# Patient Record
Sex: MALE | Race: WHITE | Employment: FULL TIME
[De-identification: names, ages, dates, MRNs, and addresses within clinical notes are randomized per-mention and may not be internally consistent; named-entity substitution may affect disease eponyms.]

---

## 2022-03-19 PROBLEM — B00.2: Status: ACTIVE | Noted: 2017-02-28

## 2022-03-19 PROBLEM — F90.0 ATTENTION DEFICIT HYPERACTIVITY DISORDER (ADHD), PREDOMINANTLY INATTENTIVE TYPE: Status: ACTIVE | Noted: 2017-10-04

## 2022-08-09 ENCOUNTER — NURSE ONLY (OUTPATIENT)
Dept: FAMILY MEDICINE CLINIC | Facility: CLINIC | Age: 27
End: 2022-08-09

## 2022-08-09 ENCOUNTER — OFFICE VISIT (OUTPATIENT)
Dept: FAMILY MEDICINE CLINIC | Facility: CLINIC | Age: 27
End: 2022-08-09
Payer: COMMERCIAL

## 2022-08-09 VITALS
SYSTOLIC BLOOD PRESSURE: 138 MMHG | DIASTOLIC BLOOD PRESSURE: 89 MMHG | RESPIRATION RATE: 18 BRPM | WEIGHT: 252.5 LBS | HEIGHT: 72 IN | OXYGEN SATURATION: 96 % | TEMPERATURE: 99 F | BODY MASS INDEX: 34.2 KG/M2 | HEART RATE: 77 BPM

## 2022-08-09 DIAGNOSIS — Z11.59 NEED FOR HEPATITIS C SCREENING TEST: ICD-10-CM

## 2022-08-09 DIAGNOSIS — Z13.220 LIPID SCREENING: ICD-10-CM

## 2022-08-09 DIAGNOSIS — Z56.6 STRESS AT WORK: ICD-10-CM

## 2022-08-09 DIAGNOSIS — Z11.4 ENCOUNTER FOR SCREENING FOR HIV: ICD-10-CM

## 2022-08-09 DIAGNOSIS — R07.9 CHEST PAIN, UNSPECIFIED TYPE: ICD-10-CM

## 2022-08-09 DIAGNOSIS — E66.09 CLASS 1 OBESITY DUE TO EXCESS CALORIES WITHOUT SERIOUS COMORBIDITY WITH BODY MASS INDEX (BMI) OF 34.0 TO 34.9 IN ADULT: ICD-10-CM

## 2022-08-09 DIAGNOSIS — Z00.00 WELL ADULT HEALTH CHECK: Primary | ICD-10-CM

## 2022-08-09 DIAGNOSIS — Z00.00 WELL ADULT HEALTH CHECK: ICD-10-CM

## 2022-08-09 DIAGNOSIS — R03.0 PREHYPERTENSION: ICD-10-CM

## 2022-08-09 LAB
BASOPHILS # BLD: 0.1 K/UL (ref 0–0.2)
BASOPHILS NFR BLD: 1 % (ref 0–2)
DIFFERENTIAL METHOD BLD: ABNORMAL
EOSINOPHIL # BLD: 0.1 K/UL (ref 0–0.8)
EOSINOPHIL NFR BLD: 1 % (ref 0.5–7.8)
ERYTHROCYTE [DISTWIDTH] IN BLOOD BY AUTOMATED COUNT: 13.1 % (ref 11.9–14.6)
HCT VFR BLD AUTO: 51.9 % (ref 41.1–50.3)
HGB BLD-MCNC: 16.9 G/DL (ref 13.6–17.2)
IMM GRANULOCYTES # BLD AUTO: 0 K/UL (ref 0–0.5)
IMM GRANULOCYTES NFR BLD AUTO: 0 % (ref 0–5)
LYMPHOCYTES # BLD: 2.3 K/UL (ref 0.5–4.6)
LYMPHOCYTES NFR BLD: 26 % (ref 13–44)
MCH RBC QN AUTO: 27.3 PG (ref 26.1–32.9)
MCHC RBC AUTO-ENTMCNC: 32.6 G/DL (ref 31.4–35)
MCV RBC AUTO: 84 FL (ref 79.6–97.8)
MONOCYTES # BLD: 0.8 K/UL (ref 0.1–1.3)
MONOCYTES NFR BLD: 9 % (ref 4–12)
NEUTS SEG # BLD: 5.4 K/UL (ref 1.7–8.2)
NEUTS SEG NFR BLD: 63 % (ref 43–78)
NRBC # BLD: 0 K/UL (ref 0–0.2)
PLATELET # BLD AUTO: 336 K/UL (ref 150–450)
PMV BLD AUTO: 10.7 FL (ref 9.4–12.3)
RBC # BLD AUTO: 6.18 M/UL (ref 4.23–5.6)
WBC # BLD AUTO: 8.7 K/UL (ref 4.3–11.1)

## 2022-08-09 PROCEDURE — 93000 ELECTROCARDIOGRAM COMPLETE: CPT | Performed by: FAMILY MEDICINE

## 2022-08-09 PROCEDURE — 99385 PREV VISIT NEW AGE 18-39: CPT | Performed by: FAMILY MEDICINE

## 2022-08-09 SDOH — HEALTH STABILITY - MENTAL HEALTH: OTHER PHYSICAL AND MENTAL STRAIN RELATED TO WORK: Z56.6

## 2022-08-09 NOTE — PROGRESS NOTES
[address] [facility] [address]  Phone: [phone] Fax [phone]  Ilene Zelaya M.D.  8/9/2022        Marina Humphrey is a 32 y.o. male     HPI:  Patient is seen for Other (Reestablished care, needs forms filled out for insurance )  Patient comes in today for wellness exam fasting to reestablish care. He describes a fair amount of stress associated with his work. Notices higher heart rates while at work with this documented by his [employer] watch. He occasionally will notice some left side chest tightness more noted with stress and not specifically with exertion. No specific exercise other than playing golf on occasion. Has noted some weight gain. ROS:  Constitutional: denies excessive fatigue; no fever or chills; no polydipsia polyphagia or polyuria  HEENT: no nasal pressure or drainage, no otalgia or tinnitus or decreased hearing   Pulmonary: no dyspnea cough or wheeze  Cardiac: no palpitations, no tachycardia, no PND or orthopnea. GI: no dyspepsia or reflux; normal bowel movements, no nausea or vomiting or diarrhea, no hematochezia or melena, no abdominal pain  : normal urination without dysuria or hematuria, nocturia or urinary hesitancy; no significant incontinence  MSK: no significant myalgias or arthralgias except mild back discomfort from previous injury without radicular pain.   Neurological: no memory loss or trouble with balance or falls, no numbness or tingling or weakness  Psychiatric: no depression, no insomnia, no suicidal or homicidal ideation  Skin: no rash or itching or new lesions    No Known Allergies    Active Ambulatory Problems     Diagnosis Date Noted    Pharyngitis due to herpes simplex virus (HSV) 02/28/2017    Attention deficit hyperactivity disorder (ADHD), predominantly inattentive type 10/04/2017    Prehypertension 08/09/2022     Resolved Ambulatory Problems     Diagnosis Date Noted    No Resolved Ambulatory Problems No Additional Past Medical History        Past Surgical History:   Procedure Laterality Date    FRACTURE SURGERY  2012    Dr. Ravi Babin; reset wrist fracture    Michael Figueroa; age 25      Family History   Problem Relation Age of Onset    Hypertension Mother     Hypertension Father     High Cholesterol Father     Asthma Father     Dementia Maternal Grandmother     Heart Disease Maternal Grandfather     Cancer Paternal Grandmother         ovarian    Cancer Paternal Grandfather         prostate       Social History     Tobacco Use    Smoking status: Never    Smokeless tobacco: Never   Vaping Use    Vaping Use: Never used   Substance Use Topics    Alcohol use: Yes     Alcohol/week: 1.0 standard drink     Types: 1 Shots of liquor per week    Drug use: Never     Immunization History   Administered Date(s) Administered    COVID-19, MODERNA BLUE border, Primary or Immunocompromised, (age 12y+), IM, 100 mcg/0.5mL 07/18/2021, 08/15/2021    DTaP vaccine 1995, 1995, 03/01/1996, 08/27/1996, 10/08/1999    Hepatitis B vaccine 1995, 1995, 03/01/1996    Hib vaccine 1995, 1995, 03/01/1996, 08/27/1996    MMR 08/27/1996, 10/08/1999    Meningococcal MCV4P (Menactra) 07/26/2013    Polio Virus Vaccine 1995, 1995, 03/01/1996, 10/08/1999    Tdap (Boostrix, Adacel) 07/26/2013    Varicella (Varivax) 08/27/1996       Physical Exam:  Blood pressure 138/89, pulse 77, temperature 99 °F (37.2 °C), temperature source Temporal, resp. rate 18, height 6' (1.829 m), weight 252 lb 8 oz (114.5 kg), SpO2 96 %. HEENT: TMs and external canals clear. EOMI. Nasal mucosa and oropharynx moist and intact. Neck: supple, no cervical adenopathy; no appreciable thyromegaly or thyroid nodules; appropriate carotid upstroke. Lungs: normal inspiratory movement, clear with good breath sounds and no rales, wheezes, or rhonchi  CV: No JVD or hepatojugular reflux.   Normal S1 and S2 with regular rate and rhythm, no murmurs or rubs or gallops; radial and femoral pulses palpable. No palpable chest wall tenderness. Abdomen: soft, moderately obese, and nontender, no masses or hepatosplenomegaly; positive bowel sounds. Extremities: no peripheral edema or cyanosis; moves all extremities well  Neurological: alert and oriented x 3; normal gait and 2+ patellar deep tendon reflexes  Dermatological: skin warm dry and intact without significant rashes or concerning lesions. No inguinal or axillary lymphadenopathy. Affect is appropriate. Results for orders placed or performed in visit on 08/09/22   EKG 12 Lead    Impression    Twelve-lead EKG with ventricular rate of 68. Normal sinus rhythm and normal EKG. Assessment and Plan:   Diagnosis Orders   1. Well adult health check  EKG 12 Lead    CBC with Auto Differential    Comprehensive Metabolic Panel    Hepatitis C Antibody    HIV 1/2 Ag/Ab, 4TH Generation,W Rflx Confirm    Lipid Panel    TSH      2. Stress at work        3. Chest pain, unspecified type  EKG 12 Lead      4. Prehypertension  CBC with Auto Differential    Comprehensive Metabolic Panel    TSH      5. Class 1 obesity due to excess calories without serious comorbidity with body mass index (BMI) of 34.0 to 34.9 in adult        6. Lipid screening  Lipid Panel      7. Encounter for screening for HIV  HIV 1/2 Ag/Ab, 4TH Generation,W Rflx Confirm      8. Need for hepatitis C screening test  Hepatitis C Antibody        Information on wellness/anticipatory guidance provided. Also information on elevated blood pressure and home blood pressure monitoring provided and information chest pain, starting weight loss plan, low carbohydrate diet, stress management, and stress mindfulness provided. Await fasting labs as above. Encouraged 150 minutes of low impact aerobic activity weekly. Also encouraged resistance training every other day with 24-48 hours of muscle glucose uptake subsequently.   Reviewed with patient his EKG as above. Seek emergent help as needed. Encouraged healthy diet and weight loss. Reassess here in 4 months or more quickly as needed. Discharge Meds:  No current outpatient medications on file. No current facility-administered medications for this visit. Return in about 4 months (around 12/9/2022). Any new medications were explained today including any potential drug interactions or significant side effects. The patient's questions in regards to this were answered today. Dictated using voice recognition software.   Proofread, but unrecognized errors may exist.

## 2022-08-10 LAB
ALBUMIN SERPL-MCNC: 4.4 G/DL (ref 3.5–5)
ALBUMIN/GLOB SERPL: 1.5 {RATIO} (ref 1.2–3.5)
ALP SERPL-CCNC: 99 U/L (ref 50–136)
ALT SERPL-CCNC: 59 U/L (ref 12–65)
ANION GAP SERPL CALC-SCNC: 6 MMOL/L (ref 7–16)
AST SERPL-CCNC: 22 U/L (ref 15–37)
BILIRUB SERPL-MCNC: 0.5 MG/DL (ref 0.2–1.1)
BUN SERPL-MCNC: 9 MG/DL (ref 6–23)
CALCIUM SERPL-MCNC: 9.4 MG/DL (ref 8.3–10.4)
CHLORIDE SERPL-SCNC: 106 MMOL/L (ref 98–107)
CHOLEST SERPL-MCNC: 247 MG/DL
CO2 SERPL-SCNC: 25 MMOL/L (ref 21–32)
CREAT SERPL-MCNC: 0.9 MG/DL (ref 0.8–1.5)
GLOBULIN SER CALC-MCNC: 3 G/DL (ref 2.3–3.5)
GLUCOSE SERPL-MCNC: 65 MG/DL (ref 65–100)
HCV AB SER QL: NONREACTIVE
HDLC SERPL-MCNC: 30 MG/DL (ref 40–60)
HDLC SERPL: 8.2 {RATIO}
HIV 1+2 AB+HIV1 P24 AG SERPL QL IA: NONREACTIVE
HIV 1/2 RESULT COMMENT: NORMAL
LDLC SERPL CALC-MCNC: 170.2 MG/DL
POTASSIUM SERPL-SCNC: 4.3 MMOL/L (ref 3.5–5.1)
PROT SERPL-MCNC: 7.4 G/DL (ref 6.3–8.2)
SODIUM SERPL-SCNC: 137 MMOL/L (ref 136–145)
TRIGL SERPL-MCNC: 234 MG/DL (ref 35–150)
TSH, 3RD GENERATION: 0.75 UIU/ML (ref 0.36–3.74)
VLDLC SERPL CALC-MCNC: 46.8 MG/DL (ref 6–23)

## 2024-02-14 ENCOUNTER — E-VISIT (OUTPATIENT)
Dept: FAMILY MEDICINE CLINIC | Facility: CLINIC | Age: 29
End: 2024-02-14
Payer: COMMERCIAL

## 2024-02-14 DIAGNOSIS — J11.1 INFLUENZA: Primary | ICD-10-CM

## 2024-02-14 PROCEDURE — 99421 OL DIG E/M SVC 5-10 MIN: CPT | Performed by: FAMILY MEDICINE

## 2024-02-15 RX ORDER — OSELTAMIVIR PHOSPHATE 75 MG/1
75 CAPSULE ORAL 2 TIMES DAILY
Qty: 10 CAPSULE | Refills: 0 | Status: SHIPPED | OUTPATIENT
Start: 2024-02-15 | End: 2024-02-20

## 2024-02-15 NOTE — PROGRESS NOTES
Johnny Wagner  1995 initiated an asynchronous digital communication through [employer].    HPI: per patient questionnaire     Exam: not applicable    Diagnoses and all orders for this visit:  Diagnoses and all orders for this visit:    Influenza  -     oseltamivir (TAMIFLU) 75 MG capsule; Take 1 capsule by mouth 2 times daily for 5 days    Treat with Tamiflu twice daily for 5 days.  May also continue other symptomatic treatment.  Recommend fluids and rest.  Reassess as needed.      Time: EV1 - 5-10 minutes were spent on the digital evaluation and management of this patient.      Elliott Serra MD

## 2024-04-10 ENCOUNTER — E-VISIT (OUTPATIENT)
Dept: FAMILY MEDICINE CLINIC | Facility: CLINIC | Age: 29
End: 2024-04-10
Payer: COMMERCIAL

## 2024-04-10 DIAGNOSIS — J06.9 UPPER RESPIRATORY TRACT INFECTION, UNSPECIFIED TYPE: Primary | ICD-10-CM

## 2024-04-10 PROCEDURE — 99421 OL DIG E/M SVC 5-10 MIN: CPT | Performed by: FAMILY MEDICINE

## 2024-04-10 ASSESSMENT — LIFESTYLE VARIABLES: SMOKING_STATUS: NO, I'VE NEVER SMOKED

## 2024-04-11 RX ORDER — AMOXICILLIN 875 MG/1
875 TABLET, COATED ORAL 2 TIMES DAILY
Qty: 20 TABLET | Refills: 0 | Status: SHIPPED | OUTPATIENT
Start: 2024-04-11 | End: 2024-04-21

## 2024-04-11 NOTE — PROGRESS NOTES
Johnny Wagner  1995 initiated an asynchronous digital communication through [employer].    HPI: per patient questionnaire     Exam: not applicable    Diagnoses and all orders for this visit:  Diagnoses and all orders for this visit:    Upper respiratory tract infection, unspecified type  -     amoxicillin (AMOXIL) 875 MG tablet; Take 1 tablet by mouth 2 times daily for 10 days     I will send in an antibiotic but if not improved soon, may need further evaluation.      Time: EV1 - 5-10 minutes were spent on the digital evaluation and management of this patient.      Elliott Serra MD

## 2024-04-15 ENCOUNTER — OFFICE VISIT (OUTPATIENT)
Dept: FAMILY MEDICINE CLINIC | Facility: CLINIC | Age: 29
End: 2024-04-15
Payer: COMMERCIAL

## 2024-04-15 VITALS
BODY MASS INDEX: 37.59 KG/M2 | HEART RATE: 84 BPM | HEIGHT: 72 IN | OXYGEN SATURATION: 97 % | SYSTOLIC BLOOD PRESSURE: 147 MMHG | WEIGHT: 277.5 LBS | DIASTOLIC BLOOD PRESSURE: 98 MMHG | TEMPERATURE: 98.2 F | RESPIRATION RATE: 16 BRPM

## 2024-04-15 DIAGNOSIS — R03.0 ELEVATED BLOOD PRESSURE READING WITHOUT DIAGNOSIS OF HYPERTENSION: ICD-10-CM

## 2024-04-15 DIAGNOSIS — K12.0 RECURRENT APHTHOUS ULCER: Primary | ICD-10-CM

## 2024-04-15 PROCEDURE — 99213 OFFICE O/P EST LOW 20 MIN: CPT | Performed by: PHYSICIAN ASSISTANT

## 2024-04-15 RX ORDER — VALACYCLOVIR HYDROCHLORIDE 1 G/1
1000 TABLET, FILM COATED ORAL 2 TIMES DAILY
Qty: 10 TABLET | Refills: 5 | Status: SHIPPED | OUTPATIENT
Start: 2024-04-15

## 2024-04-15 SDOH — ECONOMIC STABILITY: TRANSPORTATION INSECURITY
IN THE PAST 12 MONTHS, HAS LACK OF TRANSPORTATION KEPT YOU FROM MEETINGS, WORK, OR FROM GETTING THINGS NEEDED FOR DAILY LIVING?: PATIENT DECLINED

## 2024-04-15 SDOH — ECONOMIC STABILITY: INCOME INSECURITY: HOW HARD IS IT FOR YOU TO PAY FOR THE VERY BASICS LIKE FOOD, HOUSING, MEDICAL CARE, AND HEATING?: PATIENT DECLINED

## 2024-04-15 SDOH — ECONOMIC STABILITY: FOOD INSECURITY: WITHIN THE PAST 12 MONTHS, THE FOOD YOU BOUGHT JUST DIDN'T LAST AND YOU DIDN'T HAVE MONEY TO GET MORE.: PATIENT DECLINED

## 2024-04-15 SDOH — ECONOMIC STABILITY: HOUSING INSECURITY
IN THE LAST 12 MONTHS, WAS THERE A TIME WHEN YOU DID NOT HAVE A STEADY PLACE TO SLEEP OR SLEPT IN A SHELTER (INCLUDING NOW)?: NO

## 2024-04-15 SDOH — ECONOMIC STABILITY: FOOD INSECURITY: WITHIN THE PAST 12 MONTHS, YOU WORRIED THAT YOUR FOOD WOULD RUN OUT BEFORE YOU GOT MONEY TO BUY MORE.: PATIENT DECLINED

## 2024-04-15 ASSESSMENT — ENCOUNTER SYMPTOMS
RHINORRHEA: 1
SINUS PAIN: 0
SORE THROAT: 1
SINUS PRESSURE: 1
WHEEZING: 0
COUGH: 1
SHORTNESS OF BREATH: 0

## 2024-04-15 ASSESSMENT — PATIENT HEALTH QUESTIONNAIRE - PHQ9
1. LITTLE INTEREST OR PLEASURE IN DOING THINGS: SEVERAL DAYS
SUM OF ALL RESPONSES TO PHQ QUESTIONS 1-9: 2
SUM OF ALL RESPONSES TO PHQ9 QUESTIONS 1 & 2: 2
SUM OF ALL RESPONSES TO PHQ9 QUESTIONS 1 & 2: 2
2. FEELING DOWN, DEPRESSED OR HOPELESS: SEVERAL DAYS
1. LITTLE INTEREST OR PLEASURE IN DOING THINGS: SEVERAL DAYS
2. FEELING DOWN, DEPRESSED OR HOPELESS: SEVERAL DAYS
SUM OF ALL RESPONSES TO PHQ QUESTIONS 1-9: 2

## 2024-04-15 NOTE — PROGRESS NOTES
regular rhythm.      Heart sounds: Normal heart sounds.   Pulmonary:      Effort: Pulmonary effort is normal.      Breath sounds: Normal breath sounds. No wheezing, rhonchi or rales.   Musculoskeletal:      Cervical back: Neck supple.      Right lower leg: No edema.      Left lower leg: No edema.   Lymphadenopathy:      Cervical: No cervical adenopathy.   Neurological:      Mental Status: He is alert.   Psychiatric:         Mood and Affect: Mood normal.         Behavior: Behavior normal.         Thought Content: Thought content normal.       ASSESSMENT & PLAN    ICD-10-CM    1. Recurrent aphthous ulcer  K12.0 valACYclovir (VALTREX) 1 g tablet      2. Elevated blood pressure reading without diagnosis of hypertension  R03.0            1. Recurrent aphthous ulcer  *Will treat current episode with Valtrex twice daily for 5 days.  Refills given for any future recurrence.  - valACYclovir (VALTREX) 1 g tablet; Take 1 tablet by mouth 2 times daily  Dispense: 10 tablet; Refill: 5    2. Elevated blood pressure reading without diagnosis of hypertension  *Blood pressure somewhat elevated today.  No prior history of hypertension.  He does have the ability to check his blood pressure at home.  Have asked patient to monitor blood pressure at home.  He is to record readings and call if consistently greater than 140/90.      No orders of the defined types were placed in this encounter.    I have reviewed the patient's past medical history, social history and family history and vitals.  We have discussed treatment plan and follow up and given patient instructions.  Patient's questions are answered and we will follow up as indicated.    Dictated using voice recognition software.  Proof read but unrecognized errors may exist.    Follow-up and Dispositions    Return if symptoms worsen or fail to improve.         Lion Crockett PA-C

## 2024-04-15 NOTE — PATIENT INSTRUCTIONS
For this current issue, take the valacyclovir twice daily for 5 days.  He will have refills on this to start if you should have ulcers that pop up again in the future.  Try to take it as soon as possible once the ulcer appears.

## 2024-05-17 DIAGNOSIS — K12.0 RECURRENT APHTHOUS ULCER: ICD-10-CM

## 2024-05-20 RX ORDER — VALACYCLOVIR HYDROCHLORIDE 1 G/1
1000 TABLET, FILM COATED ORAL 2 TIMES DAILY
Qty: 10 TABLET | Refills: 5 | OUTPATIENT
Start: 2024-05-20

## 2024-05-21 ENCOUNTER — OFFICE VISIT (OUTPATIENT)
Dept: FAMILY MEDICINE CLINIC | Facility: CLINIC | Age: 29
End: 2024-05-21
Payer: COMMERCIAL

## 2024-05-21 VITALS
TEMPERATURE: 97.5 F | DIASTOLIC BLOOD PRESSURE: 102 MMHG | HEIGHT: 72 IN | WEIGHT: 273.4 LBS | SYSTOLIC BLOOD PRESSURE: 150 MMHG | HEART RATE: 81 BPM | RESPIRATION RATE: 16 BRPM | OXYGEN SATURATION: 98 % | BODY MASS INDEX: 37.03 KG/M2

## 2024-05-21 DIAGNOSIS — L23.7 CONTACT DERMATITIS DUE TO POISON IVY: ICD-10-CM

## 2024-05-21 DIAGNOSIS — K12.0 RECURRENT APHTHOUS ULCER: Primary | ICD-10-CM

## 2024-05-21 DIAGNOSIS — R03.0 ELEVATED BLOOD PRESSURE READING WITHOUT DIAGNOSIS OF HYPERTENSION: ICD-10-CM

## 2024-05-21 PROCEDURE — 99213 OFFICE O/P EST LOW 20 MIN: CPT | Performed by: PHYSICIAN ASSISTANT

## 2024-05-21 RX ORDER — TRIAMCINOLONE ACETONIDE 5 MG/G
CREAM TOPICAL
Qty: 45 G | Refills: 1 | Status: SHIPPED | OUTPATIENT
Start: 2024-05-21 | End: 2024-05-28

## 2024-05-21 RX ORDER — VALACYCLOVIR HYDROCHLORIDE 500 MG/1
500 TABLET, FILM COATED ORAL 2 TIMES DAILY
Qty: 60 TABLET | Refills: 5 | Status: SHIPPED | OUTPATIENT
Start: 2024-05-21

## 2024-05-21 ASSESSMENT — PATIENT HEALTH QUESTIONNAIRE - PHQ9
SUM OF ALL RESPONSES TO PHQ9 QUESTIONS 1 & 2: 0
1. LITTLE INTEREST OR PLEASURE IN DOING THINGS: NOT AT ALL
SUM OF ALL RESPONSES TO PHQ QUESTIONS 1-9: 0
2. FEELING DOWN, DEPRESSED OR HOPELESS: NOT AT ALL

## 2024-05-21 ASSESSMENT — ENCOUNTER SYMPTOMS
SORE THROAT: 1
TROUBLE SWALLOWING: 0
RHINORRHEA: 0

## 2024-05-21 NOTE — PATIENT INSTRUCTIONS
*Finish your current Valtrex 5-day course.  Then begin the 500 mg Valtrex twice daily and take this for at least the next 4 months.  We can reassess at that time to see if we need to continue or not.  *Please keep track of your blood pressure. Check it 3-4 days a week. Write down your results and bring them with you to your next office visit for review. We'd like you to stay < 140/90, and ideally < 130/80. Let us know if you find yourself above this routinely.  *Apply the

## 2024-05-21 NOTE — PROGRESS NOTES
[facility] [address] [address]  Phone [phone]      Patient: Johnny Wagner  YOB: 1995  Age 28 y.o.  Sex male  Medical Record:  [id number]  Visit Date: 05/21/24  Author:  Lion Crockett PA-C    [facility] Clinic Note    Chief Complaint   Patient presents with    Pharyngitis     Pt states he's still has ulcers on his tonsils       History of Present Illness  This is a 28-year-old male who returns today with complaints of recurrent ulcerations in his mouth.  He was seen here about 5 weeks ago for the same.  He was given Valtrex to take 1 g twice daily for 5 days for flares.  He states that the lesions seem to respond to the Valtrex and seem to disappear but recur again within a couple of days.  Currently has 2 spots on the posterior pharynx which have been aggravated again over the last few days.  These flares because constant discomfort in the throat.  Denies any difficulty swallowing.    He also notes that he has a rash on his right leg from what he feels was a poison ivy exposure.  Rash is pruritic.  Denies any associated pain.  No fever or chills.    Patient has a history of elevated blood pressure.  He was instructed, at last visit, to begin monitoring his blood pressure at home.  He states that he did order a blood pressure cuff and is expecting it to come in sometime this week.      Past History:    Past Medical history History reviewed. No pertinent past medical history.    Current Problem List:   Patient Active Problem List   Diagnosis    Pharyngitis due to herpes simplex virus (HSV)    Attention deficit hyperactivity disorder (ADHD), predominantly inattentive type    Prehypertension       Current Medications: .  Current Outpatient Medications   Medication Sig Dispense Refill    valACYclovir (VALTREX) 1 g tablet Take 1 tablet by mouth 2 times daily 10 tablet 5     No current facility-administered medications for this visit.        Allergies:No

## 2024-07-02 ENCOUNTER — OFFICE VISIT (OUTPATIENT)
Dept: FAMILY MEDICINE CLINIC | Facility: CLINIC | Age: 29
End: 2024-07-02
Payer: COMMERCIAL

## 2024-07-02 VITALS
HEIGHT: 72 IN | RESPIRATION RATE: 16 BRPM | TEMPERATURE: 97.5 F | DIASTOLIC BLOOD PRESSURE: 78 MMHG | SYSTOLIC BLOOD PRESSURE: 130 MMHG | OXYGEN SATURATION: 98 % | WEIGHT: 274.6 LBS | HEART RATE: 81 BPM | BODY MASS INDEX: 37.19 KG/M2

## 2024-07-02 DIAGNOSIS — K12.0 RECURRENT APHTHOUS ULCER: Primary | ICD-10-CM

## 2024-07-02 DIAGNOSIS — R03.0 ELEVATED BLOOD PRESSURE READING WITHOUT DIAGNOSIS OF HYPERTENSION: ICD-10-CM

## 2024-07-02 PROCEDURE — 99213 OFFICE O/P EST LOW 20 MIN: CPT | Performed by: PHYSICIAN ASSISTANT

## 2024-07-02 ASSESSMENT — ENCOUNTER SYMPTOMS: SHORTNESS OF BREATH: 0

## 2024-07-02 ASSESSMENT — PATIENT HEALTH QUESTIONNAIRE - PHQ9
SUM OF ALL RESPONSES TO PHQ QUESTIONS 1-9: 0
1. LITTLE INTEREST OR PLEASURE IN DOING THINGS: NOT AT ALL
SUM OF ALL RESPONSES TO PHQ QUESTIONS 1-9: 0
2. FEELING DOWN, DEPRESSED OR HOPELESS: NOT AT ALL
SUM OF ALL RESPONSES TO PHQ9 QUESTIONS 1 & 2: 0

## 2024-07-02 NOTE — PROGRESS NOTES
[facility] [address] [address]  Phone [phone]      Patient: Johnny Wagner  YOB: 1995  Age 28 y.o.  Sex male  Medical Record:  [id number]  Visit Date: 07/02/24  Author:  Lion Crockett PA-C    [facility] Clinic Note    Chief Complaint   Patient presents with    Other     Mouth ulcers    Blood Pressure Check       History of Present Illness  This is a 28-year-old male who returns today, as scheduled, for follow-up regarding chronic, recurrent aphthous ulcers.  He was started on prophylactic Valtrex to take daily.  He has been taking 500 mg twice daily and notes that the frequency and intensity of his outbreaks has decreased significantly.  He does have a single lesion on the top of his mouth that showed up this morning but otherwise has been doing quite well.    Patient also returns to follow-up on blood pressure.  His blood pressure has been noted to be elevated in the past.  He has been monitoring his blood pressure at home routinely and brings readings for review.  Readings are consistently less than 140/90.    Past History:    Past Medical history History reviewed. No pertinent past medical history.    Current Problem List:   Patient Active Problem List   Diagnosis    Pharyngitis due to herpes simplex virus (HSV)    Attention deficit hyperactivity disorder (ADHD), predominantly inattentive type    Prehypertension       Current Medications: .  Current Outpatient Medications   Medication Sig Dispense Refill    valACYclovir (VALTREX) 500 MG tablet Take 1 tablet by mouth 2 times daily 60 tablet 5    valACYclovir (VALTREX) 1 g tablet Take 1 tablet by mouth 2 times daily (Patient not taking: Reported on 7/2/2024) 10 tablet 5     No current facility-administered medications for this visit.        Allergies:No Known Allergies    Surgical History:  Past Surgical History:   Procedure Laterality Date    FRACTURE SURGERY  2012    Dr. Lord; reset wrist fracture 
Temp 97.5 °F (36.4 °C)   Resp 16   Ht 1.829 m (6')   Wt 124.6 kg (274 lb 9.6 oz)   SpO2 98%   BMI 37.24 kg/m²   Body mass index is 37.24 kg/m².     Physical Exam    Physical Exam

## 2025-06-16 ENCOUNTER — OFFICE VISIT (OUTPATIENT)
Dept: FAMILY MEDICINE CLINIC | Facility: CLINIC | Age: 30
End: 2025-06-16
Payer: COMMERCIAL

## 2025-06-16 VITALS
HEART RATE: 83 BPM | HEIGHT: 72 IN | BODY MASS INDEX: 39.01 KG/M2 | OXYGEN SATURATION: 98 % | DIASTOLIC BLOOD PRESSURE: 78 MMHG | WEIGHT: 288 LBS | SYSTOLIC BLOOD PRESSURE: 134 MMHG

## 2025-06-16 DIAGNOSIS — G89.29 CHRONIC PAIN OF RIGHT ANKLE: ICD-10-CM

## 2025-06-16 DIAGNOSIS — Z00.00 WELL ADULT HEALTH CHECK: Primary | ICD-10-CM

## 2025-06-16 DIAGNOSIS — R03.0 PREHYPERTENSION: ICD-10-CM

## 2025-06-16 DIAGNOSIS — M25.571 CHRONIC PAIN OF RIGHT ANKLE: ICD-10-CM

## 2025-06-16 DIAGNOSIS — E66.01 CLASS 2 SEVERE OBESITY DUE TO EXCESS CALORIES WITH SERIOUS COMORBIDITY AND BODY MASS INDEX (BMI) OF 39.0 TO 39.9 IN ADULT (HCC): ICD-10-CM

## 2025-06-16 DIAGNOSIS — E66.812 CLASS 2 SEVERE OBESITY DUE TO EXCESS CALORIES WITH SERIOUS COMORBIDITY AND BODY MASS INDEX (BMI) OF 39.0 TO 39.9 IN ADULT (HCC): ICD-10-CM

## 2025-06-16 DIAGNOSIS — E78.2 MIXED HYPERLIPIDEMIA: ICD-10-CM

## 2025-06-16 PROCEDURE — 99395 PREV VISIT EST AGE 18-39: CPT | Performed by: FAMILY MEDICINE

## 2025-06-16 SDOH — ECONOMIC STABILITY: FOOD INSECURITY: WITHIN THE PAST 12 MONTHS, YOU WORRIED THAT YOUR FOOD WOULD RUN OUT BEFORE YOU GOT MONEY TO BUY MORE.: PATIENT DECLINED

## 2025-06-16 SDOH — ECONOMIC STABILITY: FOOD INSECURITY: WITHIN THE PAST 12 MONTHS, THE FOOD YOU BOUGHT JUST DIDN'T LAST AND YOU DIDN'T HAVE MONEY TO GET MORE.: PATIENT DECLINED

## 2025-06-16 SDOH — ECONOMIC STABILITY: INCOME INSECURITY: IN THE LAST 12 MONTHS, WAS THERE A TIME WHEN YOU WERE NOT ABLE TO PAY THE MORTGAGE OR RENT ON TIME?: PATIENT DECLINED

## 2025-06-16 SDOH — ECONOMIC STABILITY: TRANSPORTATION INSECURITY
IN THE PAST 12 MONTHS, HAS THE LACK OF TRANSPORTATION KEPT YOU FROM MEDICAL APPOINTMENTS OR FROM GETTING MEDICATIONS?: PATIENT DECLINED

## 2025-06-16 ASSESSMENT — PATIENT HEALTH QUESTIONNAIRE - PHQ9
SUM OF ALL RESPONSES TO PHQ QUESTIONS 1-9: 1
SUM OF ALL RESPONSES TO PHQ QUESTIONS 1-9: 1
SUM OF ALL RESPONSES TO PHQ9 QUESTIONS 1 & 2: 1
1. LITTLE INTEREST OR PLEASURE IN DOING THINGS: SEVERAL DAYS
2. FEELING DOWN, DEPRESSED OR HOPELESS: NOT AT ALL
SUM OF ALL RESPONSES TO PHQ QUESTIONS 1-9: 1
1. LITTLE INTEREST OR PLEASURE IN DOING THINGS: SEVERAL DAYS
2. FEELING DOWN, DEPRESSED OR HOPELESS: NOT AT ALL
SUM OF ALL RESPONSES TO PHQ QUESTIONS 1-9: 1

## 2025-06-16 NOTE — PROGRESS NOTES
[facility] [place] [address]  Phone: [phone] Fax [phone]  Elliott Serra M.D.  6/16/2025        Johnny Wagner is a 29 y.o. male     HPI:  Patient is seen for Annual Exam     Patient comes in today for annual exam.  He is not fasting but states that he will have fasting labs drawn at work in July.  Chart review does show where patient had some fasting labs drawn in 2022 with notation for some mixed hyperlipidemia.     States that he believes his A1c was in the prediabetic range when labs were performed at work last year but had difficulty today finding this information for confirmation.  States that he has recently become more concerned about his weight and the need to decrease weight secondary to surmounting health risks.  He was noting higher blood pressures in the recent past as well as increasing amounts of time needed to recover from recurrent right ankle sprains.  Asks about GLP-1 therapy in helping reduce weight.  Plans on starting going to a gym in nearby [facility].    Patient was last seen in our office in July 2024 with notation for some elevated blood pressures.  Prior to that he had evidence for prehypertension.  With some recent dietary changes and significant reduction of caffeine intake he reports having lost some weight and has noted blood pressure improvements when checking at home.  Blood pressures recently have been mostly in the 120s to 130s over 70s to 80s.    Patient has had history of recurrent right ankle sprains.  First had this occur in high school and then multiple times in college.  Since graduation he has had several more episodes with resulting swelling and pain and subsequent sensation of instability which has gradually increased over time.     He finds himself walking putting his right foot stiff at a 90 degree angle in hopes of not \"rolling it\".  Has significant soreness when walking on an uneven surface.  Walking the

## 2025-07-29 DIAGNOSIS — K12.0 RECURRENT APHTHOUS ULCER: ICD-10-CM

## 2025-07-29 RX ORDER — VALACYCLOVIR HYDROCHLORIDE 500 MG/1
500 TABLET, FILM COATED ORAL 2 TIMES DAILY
Qty: 60 TABLET | Refills: 5 | OUTPATIENT
Start: 2025-07-29

## 2025-07-29 RX ORDER — VALACYCLOVIR HYDROCHLORIDE 500 MG/1
500 TABLET, FILM COATED ORAL 2 TIMES DAILY
Qty: 60 TABLET | Refills: 8 | Status: SHIPPED | OUTPATIENT
Start: 2025-07-29

## 2025-07-31 RX ORDER — VALACYCLOVIR HYDROCHLORIDE 500 MG/1
500 TABLET, FILM COATED ORAL 2 TIMES DAILY
Qty: 60 TABLET | Refills: 5 | OUTPATIENT
Start: 2025-07-31